# Patient Record
Sex: FEMALE | Race: WHITE
[De-identification: names, ages, dates, MRNs, and addresses within clinical notes are randomized per-mention and may not be internally consistent; named-entity substitution may affect disease eponyms.]

---

## 2018-09-01 ENCOUNTER — HOSPITAL ENCOUNTER (EMERGENCY)
Dept: HOSPITAL 31 - C.ER | Age: 23
Discharge: HOME | End: 2018-09-01
Payer: SELF-PAY

## 2018-09-01 VITALS
DIASTOLIC BLOOD PRESSURE: 79 MMHG | OXYGEN SATURATION: 100 % | SYSTOLIC BLOOD PRESSURE: 126 MMHG | RESPIRATION RATE: 18 BRPM | HEART RATE: 82 BPM | TEMPERATURE: 98.9 F

## 2018-09-01 DIAGNOSIS — L02.215: Primary | ICD-10-CM

## 2018-09-01 NOTE — C.PDOC
History Of Present Illness


23 year old female presents to the ED c/o painful lumps of her vaginal area for 

the past 2 days. Patient was seen by GYN 2 days ago, she had one fo the lumps 

drained and was placed on Augmentin. Patient states the other lump still 

present and has gotten bigger with some discharge now. Patient denies fever, 

chill, nausea, vomit, diarrhea, dysuria, hematuria, vaginal bleeding.


Time Seen by Provider: 09/01/18 19:19


Chief Complaint (Nursing): Female Genitourinary


History Per: Patient


History/Exam Limitations: no limitations


Onset/Duration Of Symptoms: Days (2)


Current Symptoms Are (Timing): Still Present


Quality Of Discomfort: "Pain"


Associated Symptoms: denies: Nausea, Vomiting, Diarrhea, Urinary Symptoms


Alleviating Factors: None


Recent travel outside of the [place]: No


Additional History Per: Patient


Abnormal Vaginal Bleeding: No


Last Menstral Period: 08/26/18





Past Medical History


Reviewed: Historical Data, Nursing Documentation, Vital Signs


Vital Signs: 


 Last Vital Signs











Temp  98.9 F   09/01/18 18:58


 


Pulse  82   09/01/18 18:58


 


Resp  18   09/01/18 18:58


 


BP  126/79   09/01/18 18:58


 


Pulse Ox  100   09/02/18 02:10














- Medical History


PMH: No Chronic Diseases


Surgical History: No Surg Hx


Family History: States: Unknown Family Hx





- Social History


Hx Alcohol Use: No


Hx Substance Use: No





Review Of Systems


Constitutional: Negative for: Fever, Chills


Gastrointestinal: Negative for: Nausea, Vomiting, Abdominal Pain, Diarrhea


Genitourinary: Positive for: Other (lumps vaginal area).  Negative for: Dysuria

, Hematuria


Skin: Negative for: Rash





Physical Exam





- Physical Exam


Appears: Non-toxic, No Acute Distress


Skin: Normal Color, Warm, Dry


Head: Atraumatic, Normacephalic


Eye(s): bilateral: Normal Inspection


Pelvic: Other (2x2 cm fluactuant tender mass to the perineal area with purulent 

D/C. Small healing endurated lumps over mons pubis )


Extremity: Normal ROM


Neurological/Psych: Oriented x3, Normal Speech, Normal Cognition


Gait: Steady





ED Course And Treatment


O2 Sat by Pulse Oximetry: 100 (ON RA)


Pulse Ox Interpretation: Normal


Progress Note: Patient was advised to continue medications prescribed and to 

follow up with GYN in 2 days for wound check.


Reassessment Condition: Improved





- Incision & Drainage Of Abscess


Anesthesia: Lidocaine 1%, With Epi


Used During Procedure: Oxygen


Prep Used: Sterile Water, Betadine


Procedure: Incised W/Scalpel Blade#:, Drained Pus, Irrigated Cavity W/Saline (

Pt tolerated well), Probed To Break Up Loculations





Disposition


Counseled Patient/Family Regarding: Diagnosis, Need For Followup, Rx Given





- Disposition


Referrals: 


Ramona Rhoades MD [Non-Staff] - 


Disposition: HOME/ ROUTINE


Disposition Time: 20:01


Condition: STABLE


Additional Instructions: 


Please follow up with PMD or OB/GYN in 2 days





Apply warm compress to area





Continue Augmentin as described





Return to ER if worse 


Instructions:  Boil (DC)


Forms:  [facility] (English)





- Clinical Impression


Clinical Impression: 


 Abscess, perineum








- PA / NP / Resident Statement


MD/DO has reviewed & agrees with the documentation as recorded.





- Scribe Statement


The provider has reviewed the documentation as recorded by the Scribe


Malick Ortiz





All medical record entries made by the Scribe were at my direction and 

personally dictated by me. I have reviewed the chart and agree that the record 

accurately reflects my personal performance of the history, physical exam, 

medical decision making, and the department course for this patient. I have 

also personally directed, reviewed, and agree with the discharge instructions 

and disposition.

## 2018-11-03 ENCOUNTER — HOSPITAL ENCOUNTER (EMERGENCY)
Dept: HOSPITAL 31 - C.ER | Age: 23
Discharge: HOME | End: 2018-11-03
Payer: COMMERCIAL

## 2018-11-03 VITALS
HEART RATE: 66 BPM | TEMPERATURE: 98.4 F | RESPIRATION RATE: 18 BRPM | DIASTOLIC BLOOD PRESSURE: 74 MMHG | SYSTOLIC BLOOD PRESSURE: 110 MMHG

## 2018-11-03 VITALS — OXYGEN SATURATION: 100 %

## 2018-11-03 DIAGNOSIS — L02.214: Primary | ICD-10-CM

## 2018-11-03 NOTE — C.PDOC
History Of Present Illness


22 y/o female pt comes in for evaluation for left groin painful mass. Pt reports

it gradually developed for the past x2 days. Pt notes she had similar sx in the 

past and received an I&D. Pt denies abdominal pain, fever, chills, vaginal 

discharge, dysuria, frequency and hematuria. 


Time Seen by Provider: 11/03/18 11:28


Chief Complaint (Nursing): Abnormal Skin Integrity


History Per: Patient


History/Exam Limitations: no limitations


Onset/Duration Of Symptoms: Days (x2), Gradual


Current Symptoms Are (Timing): Still Present





Past Medical History


Reviewed: Historical Data, Nursing Documentation, Vital Signs


Vital Signs: 





                                Last Vital Signs











Temp  98.6 F   11/03/18 11:22


 


Pulse  75   11/03/18 11:22


 


Resp  16   11/03/18 11:22


 


BP  121/79   11/03/18 11:22


 


Pulse Ox  100   11/03/18 11:22











Family History: States: Unknown Family Hx





- Social History


Hx Alcohol Use: No


Hx Substance Use: No





- Immunization History


Hx Tetanus Toxoid Vaccination: No


Hx Influenza Vaccination: No


Hx Pneumococcal Vaccination: No





Review Of Systems


Except As Marked, All Systems Reviewed And Found Negative.


Constitutional: Negative for: Fever, Chills


Genitourinary: Positive for: Other (left groin painful mass).  Negative for: 

Dysuria, Frequency, Hematuria, Vaginal Discharge





Physical Exam





- Physical Exam


Appears: Well, Non-toxic, No Acute Distress


Skin: Normal Color, Warm, Dry, Other ((+)left groin area small soft tender mass 

2cm diameter with opening (+) clear discahrge, mild erythema, no edema, no 

flactulance.)


Eye(s): bilateral: PERRL


Oral Mucosa: Moist


Throat: No Erythema


Gastrointestinal/Abdominal: Soft, No Tenderness, No Distention, No Guarding, No 

Rebound


Back: No CVA Tenderness


Pelvic: Normal External Exam, No Vaginal Bleeding, No Vaginal Discharge


Extremity: Normal ROM, No Deformity, No Swelling


Neurological/Psych: Oriented x3, Normal Speech, Normal Cognition





ED Course And Treatment





- Laboratory Results


Urine Pregnancy POC: Negative


O2 Sat by Pulse Oximetry: 100 (RA)


Pulse Ox Interpretation: Normal


Progress Note: Impression: left groin painful mass.  Plan:  -- Doryx.  On re-

eval, pt is afebrile, hemodynamicaly stable.  Non-toxic.  Abd: benign, (-) 

guarding, (-) rebound.  back: (-) CVA tenderness.  Skin: exam c/w self-draining 

abscess left groin area. No flactulance.  preg (-).  Pt advised.  ref. to f/u 

with PMD, GYN in 2-3 days for re-eval.  return if any new chnages.





Disposition


Counseled Patient/Family Regarding: Diagnosis, Need For Followup, Rx Given





- Disposition


Referrals: 


Ramona Rhoades MD [Non-Staff] - 


Disposition: HOME/ ROUTINE


Disposition Time: 11:45


Condition: STABLE


Additional Instructions: 


Warm salty water skin compresses twice daily for 5 minutes


take medication as prescribed


Follow up with PM Din 2-3 days for re-evaluation.


return to ED if any worsening or new changes.


Prescriptions: 


Doxycycline Hyclate [Doryx] 100 mg PO BID #14 cap


Instructions:  Boil (DC)


Forms:  [facility] (English)





- Clinical Impression


Clinical Impression: 


 Abscess








- PA / NP / Resident Statement


MD/ has reviewed & agrees with the documentation as recorded.





- Scribe Statement


The provider has reviewed the documentation as recorded by the Scribjarad Dumont Do





All medical record entries made by the Scribe were at my direction and 

personally dictated by me. I have reviewed the chart and agree that the record 

accurately reflects my personal performance of the history, physical exam, 

medical decision making, and the department course for this patient. I have also

 personally directed, reviewed, and agree with the discharge instructions and 

disposition.